# Patient Record
Sex: MALE | Race: WHITE | NOT HISPANIC OR LATINO | URBAN - METROPOLITAN AREA
[De-identification: names, ages, dates, MRNs, and addresses within clinical notes are randomized per-mention and may not be internally consistent; named-entity substitution may affect disease eponyms.]

---

## 2023-03-15 ENCOUNTER — TELEPHONE (OUTPATIENT)
Dept: PRIMARY CARE | Facility: CLINIC | Age: 53
End: 2023-03-15
Payer: COMMERCIAL

## 2023-03-15 DIAGNOSIS — R19.5 POSITIVE COLORECTAL CANCER SCREENING USING COLOGUARD TEST: Primary | ICD-10-CM

## 2023-03-15 LAB
INR IN PPP BY COAGULATION ASSAY: 1.8 (ref 0.9–1.1)
PROTHROMBIN TIME (PT) IN PPP BY COAGULATION ASSAY: 21.4 SEC (ref 9.8–13.4)

## 2023-03-15 NOTE — TELEPHONE ENCOUNTER
Patient seen 2/15 and received a [place] guard kit in mail which he did do it.   States he was advised of the results and was informed that a referral was going to be sent to his house for a Colonoscopy.     Patient never did receive it and now would like a referral to [facility] in [place], [place]   States his main support (wife) is in [place] and rather do procedure there.     Address is [address] [place] [address]  Phone number is [phone]  Patient did not have the fax number.     Please advise

## 2023-07-20 LAB
INR IN PPP BY COAGULATION ASSAY: 1.3 (ref 0.9–1.1)
PROTHROMBIN TIME (PT) IN PPP BY COAGULATION ASSAY: 14.9 SEC (ref 9.8–12.8)

## 2024-01-04 DIAGNOSIS — Z95.2 AORTIC VALVE REPLACED: ICD-10-CM

## 2024-01-04 PROBLEM — I45.10 COMPLETE RIGHT BUNDLE BRANCH BLOCK: Status: ACTIVE | Noted: 2024-01-04

## 2024-01-04 PROBLEM — I71.20 ANEURYSM OF THORACIC AORTA (CMS-HCC): Status: ACTIVE | Noted: 2024-01-04

## 2024-01-16 ENCOUNTER — LAB (OUTPATIENT)
Dept: LAB | Facility: LAB | Age: 54
End: 2024-01-16
Payer: COMMERCIAL

## 2024-01-16 DIAGNOSIS — Z95.2 AORTIC VALVE REPLACED: ICD-10-CM

## 2024-01-16 LAB
INR PPP: 1.1 (ref 0.9–1.1)
PROTHROMBIN TIME: 12.8 SECONDS (ref 9.8–12.8)

## 2024-01-16 PROCEDURE — 36415 COLL VENOUS BLD VENIPUNCTURE: CPT

## 2024-01-16 PROCEDURE — 85610 PROTHROMBIN TIME: CPT

## 2024-01-17 ENCOUNTER — ANTICOAGULATION - WARFARIN VISIT (OUTPATIENT)
Dept: CARDIOLOGY | Facility: CLINIC | Age: 54
End: 2024-01-17
Payer: COMMERCIAL

## 2024-01-17 PROBLEM — Z13.89 SCREENING FOR MULTIPLE CONDITIONS: Status: ACTIVE | Noted: 2024-01-17

## 2024-01-17 PROBLEM — Z87.74 HISTORY OF BICUSPID AORTIC VALVE: Status: ACTIVE | Noted: 2024-01-17

## 2024-01-17 PROBLEM — I10 HYPERTENSION: Status: ACTIVE | Noted: 2024-01-17

## 2024-01-17 PROBLEM — Z87.891 FORMER SMOKER: Status: ACTIVE | Noted: 2024-01-17

## 2024-01-17 PROBLEM — R36.1 BLOOD IN SEMEN: Status: ACTIVE | Noted: 2024-01-17

## 2024-01-17 PROBLEM — R19.5 POSITIVE COLORECTAL CANCER SCREENING USING COLOGUARD TEST: Status: ACTIVE | Noted: 2024-01-17

## 2024-01-17 RX ORDER — WARFARIN SODIUM 5 MG/1
5 TABLET ORAL
COMMUNITY
Start: 2022-03-07 | End: 2024-01-24 | Stop reason: SDUPTHER

## 2024-01-17 RX ORDER — LOSARTAN POTASSIUM 50 MG/1
100 TABLET ORAL DAILY
COMMUNITY
Start: 2023-03-15 | End: 2024-05-01 | Stop reason: WASHOUT

## 2024-01-17 RX ORDER — CALCIUM CARBONATE 600 MG
600 TABLET ORAL DAILY
COMMUNITY
Start: 2022-03-07 | End: 2024-05-01 | Stop reason: WASHOUT

## 2024-01-24 DIAGNOSIS — Z95.2 AORTIC VALVE REPLACED: ICD-10-CM

## 2024-01-24 RX ORDER — WARFARIN SODIUM 5 MG/1
10 TABLET ORAL SEE ADMIN INSTRUCTIONS
Qty: 60 TABLET | Refills: 1 | Status: SHIPPED | OUTPATIENT
Start: 2024-01-24 | End: 2024-03-27 | Stop reason: SDUPTHER

## 2024-01-31 ENCOUNTER — LAB (OUTPATIENT)
Dept: LAB | Facility: LAB | Age: 54
End: 2024-01-31
Payer: COMMERCIAL

## 2024-01-31 DIAGNOSIS — Z95.2 AORTIC VALVE REPLACED: ICD-10-CM

## 2024-01-31 LAB
INR PPP: 2 (ref 0.9–1.1)
PROTHROMBIN TIME: 23.1 SECONDS (ref 9.8–12.8)

## 2024-01-31 PROCEDURE — 36415 COLL VENOUS BLD VENIPUNCTURE: CPT

## 2024-01-31 PROCEDURE — 85610 PROTHROMBIN TIME: CPT

## 2024-02-01 ENCOUNTER — ANTICOAGULATION - WARFARIN VISIT (OUTPATIENT)
Dept: CARDIOLOGY | Facility: CLINIC | Age: 54
End: 2024-02-01
Payer: COMMERCIAL

## 2024-03-07 ENCOUNTER — TELEPHONE (OUTPATIENT)
Dept: CARDIOLOGY | Facility: CLINIC | Age: 54
End: 2024-03-07

## 2024-03-07 ENCOUNTER — LAB (OUTPATIENT)
Dept: LAB | Facility: LAB | Age: 54
End: 2024-03-07
Payer: COMMERCIAL

## 2024-03-07 DIAGNOSIS — Z95.2 AORTIC VALVE REPLACED: ICD-10-CM

## 2024-03-07 LAB
INR PPP: 7 (ref 0.9–1.1)
PROTHROMBIN TIME: 80.5 SECONDS (ref 9.8–12.8)

## 2024-03-07 PROCEDURE — 36415 COLL VENOUS BLD VENIPUNCTURE: CPT

## 2024-03-07 PROCEDURE — 85610 PROTHROMBIN TIME: CPT

## 2024-03-07 NOTE — TELEPHONE ENCOUNTER
Received call from Justino with critical PT of 80.5 and INR 7.0. I called Dr. Flanagan who advised to hold Warfarin for 4 days then obtain INR on Monday. I called patient and left a voicemail to return our call to make sure I know he received his instructions.

## 2024-03-08 ENCOUNTER — ANTICOAGULATION - WARFARIN VISIT (OUTPATIENT)
Dept: CARDIOLOGY | Facility: CLINIC | Age: 54
End: 2024-03-08
Payer: COMMERCIAL

## 2024-03-12 ENCOUNTER — LAB (OUTPATIENT)
Dept: LAB | Facility: LAB | Age: 54
End: 2024-03-12
Payer: COMMERCIAL

## 2024-03-12 DIAGNOSIS — Z95.2 AORTIC VALVE REPLACED: ICD-10-CM

## 2024-03-12 LAB
INR PPP: 1.2 (ref 0.9–1.1)
PROTHROMBIN TIME: 13.1 SECONDS (ref 9.8–12.8)

## 2024-03-12 PROCEDURE — 36415 COLL VENOUS BLD VENIPUNCTURE: CPT

## 2024-03-12 PROCEDURE — 85610 PROTHROMBIN TIME: CPT

## 2024-03-13 ENCOUNTER — ANTICOAGULATION - WARFARIN VISIT (OUTPATIENT)
Dept: CARDIOLOGY | Facility: CLINIC | Age: 54
End: 2024-03-13
Payer: COMMERCIAL

## 2024-03-24 DIAGNOSIS — Z95.2 AORTIC VALVE REPLACED: ICD-10-CM

## 2024-03-27 RX ORDER — WARFARIN SODIUM 5 MG/1
10 TABLET ORAL SEE ADMIN INSTRUCTIONS
Qty: 90 TABLET | Refills: 3 | OUTPATIENT
Start: 2024-03-27

## 2024-03-27 RX ORDER — WARFARIN SODIUM 5 MG/1
10 TABLET ORAL SEE ADMIN INSTRUCTIONS
Qty: 180 TABLET | Refills: 1 | Status: SHIPPED | OUTPATIENT
Start: 2024-03-27

## 2024-03-27 NOTE — TELEPHONE ENCOUNTER
Received request for prescription refills for patient.   Patient follows with Dr. Ifeanyi Alvarez MD     Last OV 4/6/2023  Next OV 4/10/24    Pended for signing and sent to provider     **Initial pharmacy request was placed for the wrong quantity for dosing instructions. That initial script was refused.   I pended correct script for Warfarin 5mg tablets, take 10mg daily as directed by NO.     Patient last INR was 1.2 on 3/13/24, advised repeat INR on 3/15/24.   I LM for patient to repeat INR asap please.

## 2024-04-01 ENCOUNTER — APPOINTMENT (OUTPATIENT)
Dept: CARDIOLOGY | Facility: CLINIC | Age: 54
End: 2024-04-01
Payer: COMMERCIAL

## 2024-04-05 DIAGNOSIS — I10 HYPERTENSION, UNSPECIFIED TYPE: ICD-10-CM

## 2024-04-08 RX ORDER — LOSARTAN POTASSIUM 100 MG/1
100 TABLET ORAL DAILY
Qty: 30 TABLET | Refills: 11 | Status: SHIPPED | OUTPATIENT
Start: 2024-04-08

## 2024-04-10 ENCOUNTER — APPOINTMENT (OUTPATIENT)
Dept: CARDIOLOGY | Facility: CLINIC | Age: 54
End: 2024-04-10
Payer: COMMERCIAL

## 2024-05-01 ENCOUNTER — OFFICE VISIT (OUTPATIENT)
Dept: CARDIOLOGY | Facility: CLINIC | Age: 54
End: 2024-05-01
Payer: COMMERCIAL

## 2024-05-01 VITALS
DIASTOLIC BLOOD PRESSURE: 78 MMHG | WEIGHT: 182.8 LBS | HEIGHT: 75 IN | BODY MASS INDEX: 22.73 KG/M2 | SYSTOLIC BLOOD PRESSURE: 112 MMHG | HEART RATE: 90 BPM

## 2024-05-01 DIAGNOSIS — Z87.891 FORMER SMOKER: ICD-10-CM

## 2024-05-01 DIAGNOSIS — I10 ESSENTIAL HYPERTENSION: ICD-10-CM

## 2024-05-01 DIAGNOSIS — Z87.74 HISTORY OF BICUSPID AORTIC VALVE: ICD-10-CM

## 2024-05-01 DIAGNOSIS — Z95.2 AORTIC VALVE REPLACED: ICD-10-CM

## 2024-05-01 DIAGNOSIS — I71.23 ANEURYSM OF DESCENDING THORACIC AORTA WITHOUT RUPTURE (CMS-HCC): ICD-10-CM

## 2024-05-01 PROCEDURE — 3008F BODY MASS INDEX DOCD: CPT | Performed by: INTERNAL MEDICINE

## 2024-05-01 PROCEDURE — 3078F DIAST BP <80 MM HG: CPT | Performed by: INTERNAL MEDICINE

## 2024-05-01 PROCEDURE — 3074F SYST BP LT 130 MM HG: CPT | Performed by: INTERNAL MEDICINE

## 2024-05-01 PROCEDURE — 1036F TOBACCO NON-USER: CPT | Performed by: INTERNAL MEDICINE

## 2024-05-01 PROCEDURE — 99214 OFFICE O/P EST MOD 30 MIN: CPT | Performed by: INTERNAL MEDICINE

## 2024-05-01 RX ORDER — CETIRIZINE HYDROCHLORIDE 10 MG/1
10 TABLET ORAL EVERY 12 HOURS
COMMUNITY

## 2024-05-01 NOTE — PATIENT INSTRUCTIONS
Reschedule your echo for this year to be done soon.  Echocardiogram to be repeated in 1 year]  Follow up office visit in 1 year.  Continue same medications/treatment.  Patient educated on proper medication use.  Patient educated on risk factor modification.  Please bring any lab results from other providers / physicians to your next appointment.    Please bring all medicines, vitamins and herbal supplements with you when you come to the office.    Prescriptions will not be filled unless you are compliant with your follow up appointments or have a follow up  appointment scheduled as per instruction of your physician.  Refills should be requested at the time of  Your visit.     Mary TAVERA LPN, am scribing for and in the presence of Dr. Ifeanyi Flanagan MD, FACC

## 2024-05-01 NOTE — PROGRESS NOTES
CARDIOLOGY OFFICE VISIT      CHIEF COMPLAINT      HISTORY OF PRESENT ILLNESS  The patient states he is doing well.  He denies chest discomfort or symptoms of myocardial ischemia.  He denies dyspnea with exertion.  He denies palpitations and syncope.  He denies prow with bleeding.  He denies problem with his medications.  He has lab work done regular basis by his family physician.  He had to reschedule his echocardiogram.  When he gets it done in the near future I told him I will call him with the results.    Impression:  History of bicuspid aortic valve with severe aortic regurgitation, status post aortic valve replacement with [employer] mechanical valve December 2000  Descending thoracic aortic aneurysm repair at time of area valve replacement surgery  Former smoker  Hypertension     Please excuse any errors in grammar or translation related to this dictation. Voice recognition software was utilized to prepare this document.          Past Medical History  Past Medical History:   Diagnosis Date    Personal history of other diseases of the circulatory system     H/O essential hypertension       Social History  Social History     Tobacco Use    Smoking status: Former     Types: Cigarettes    Smokeless tobacco: Never   Substance Use Topics    Alcohol use: Not on file     Comment: occasioanlly    Drug use: Never       Family History     Family History   Problem Relation Name Age of Onset    Other (no pertinent family history) Mother      Other (no pertinent family history) Father      Other (cerebrovascular accident) Other Grandparent     Other (malignant neoplasm) Other Grandparent         Allergies:  No Known Allergies     Outpatient Medications:  Current Outpatient Medications   Medication Instructions    cetirizine (ZYRTEC) 10 mg, oral, Every 12 hours    losartan (COZAAR) 100 mg, oral, Daily    warfarin (COUMADIN) 10 mg, oral, See admin instructions, Take 2 tablets daily or as directed per [facility]           REVIEW OF SYSTEMS  Review of Systems   All other systems reviewed and are negative.        VITALS  Vitals:    05/01/24 1528   BP: 112/78   Pulse: 90       PHYSICAL EXAM  Constitutional:       Appearance: Healthy appearance. Not in distress.   Eyes:      Conjunctiva/sclera: Conjunctivae normal.      Pupils: Pupils are equal, round, and reactive to light.   Neck:      Vascular: No JVR. JVD normal.   Pulmonary:      Effort: Pulmonary effort is normal.      Breath sounds: Normal breath sounds. No wheezing. No rhonchi. No rales.   Chest:      Chest wall: Not tender to palpatation.   Cardiovascular:      PMI at left midclavicular line. Normal rate. Regular rhythm. Normal S1. Normal S2.       Murmurs: There is no murmur.      No gallop.  No click. No rub.   Pulses:     Intact distal pulses.   Edema:     Peripheral edema absent.   Abdominal:      Tenderness: There is no abdominal tenderness.   Musculoskeletal: Normal range of motion.         General: No tenderness.      Cervical back: Normal range of motion. Skin:     General: Skin is warm and dry.   Neurological:      General: No focal deficit present.      Mental Status: Alert and oriented to person, place and time.           ASSESSMENT AND PLAN  Diagnoses and all orders for this visit:  History of bicuspid aortic valve  Aortic valve replaced  Aneurysm of descending thoracic aorta without rupture ([facility])  Body mass index (BMI) of 23.0 to 23.9 in adult  Former smoker  Essential hypertension      [unfilled]

## 2024-05-22 ENCOUNTER — HOSPITAL ENCOUNTER (OUTPATIENT)
Dept: CARDIOLOGY | Facility: CLINIC | Age: 54
Discharge: HOME | End: 2024-05-22
Payer: COMMERCIAL

## 2024-05-22 ENCOUNTER — TELEPHONE (OUTPATIENT)
Dept: CARDIOLOGY | Facility: CLINIC | Age: 54
End: 2024-05-22

## 2024-05-22 VITALS
DIASTOLIC BLOOD PRESSURE: 60 MMHG | SYSTOLIC BLOOD PRESSURE: 118 MMHG | HEIGHT: 75 IN | WEIGHT: 182 LBS | BODY MASS INDEX: 22.63 KG/M2

## 2024-05-22 DIAGNOSIS — I71.20 THORACIC AORTIC ANEURYSM (TAA), UNSPECIFIED PART, UNSPECIFIED WHETHER RUPTURED (CMS-HCC): ICD-10-CM

## 2024-05-22 DIAGNOSIS — Z87.74 PERSONAL HISTORY OF (CORRECTED) CONGENITAL MALFORMATIONS OF HEART AND CIRCULATORY SYSTEM: ICD-10-CM

## 2024-05-22 DIAGNOSIS — Z95.2 PRESENCE OF PROSTHETIC HEART VALVE: ICD-10-CM

## 2024-05-22 LAB
AORTIC VALVE MEAN GRADIENT: 17 MMHG
AORTIC VALVE PEAK VELOCITY: 2.69 M/S
AV PEAK GRADIENT: 28.9 MMHG
EJECTION FRACTION APICAL 4 CHAMBER: 58.5
LEFT VENTRICLE INTERNAL DIMENSION DIASTOLE: 4.5 CM (ref 3.5–6)
LV EJECTION FRACTION BIPLANE: 60 %
MITRAL VALVE E/A RATIO: 0.45
MITRAL VALVE E/E' RATIO: 4.3
RIGHT VENTRICLE PEAK SYSTOLIC PRESSURE: 34.1 MMHG

## 2024-05-22 PROCEDURE — 93306 TTE W/DOPPLER COMPLETE: CPT | Performed by: INTERNAL MEDICINE

## 2024-05-22 PROCEDURE — 93306 TTE W/DOPPLER COMPLETE: CPT

## 2024-05-22 NOTE — TELEPHONE ENCOUNTER
----- Message from Mary Almazan LPN sent at 5/22/2024  1:35 PM EDT -----    ----- Message -----  From: Ifenayi Flanagan MD  Sent: 5/22/2024  11:19 AM EDT  To: Mary Almazan LPN    Echocardiogram demonstrates normal left ventricular systolic function and satisfactory prosthetic aortic valve replacement  ----- Message -----  From: Livia, Syngo - Cardiology Results In  Sent: 5/22/2024  11:14 AM EDT  To: Ifeanyi Flanagan MD

## 2024-09-25 DIAGNOSIS — Z95.2 AORTIC VALVE REPLACED: ICD-10-CM

## 2024-09-25 NOTE — PROGRESS NOTES
Modifying standing INR order from [facility] provider for [facility].   Pending for provider signature

## 2024-10-20 DIAGNOSIS — Z95.2 AORTIC VALVE REPLACED: ICD-10-CM

## 2024-10-21 RX ORDER — WARFARIN SODIUM 5 MG/1
10 TABLET ORAL SEE ADMIN INSTRUCTIONS
Qty: 180 TABLET | Refills: 1 | Status: SHIPPED | OUTPATIENT
Start: 2024-10-21

## 2024-10-21 NOTE — TELEPHONE ENCOUNTER
Received request for prescription refills for patient.   Patient follows with Dr. Flanagan     Request is for Warfarin 5mg take 2 tabs daily or as directed by [facility]  Is patient currently on medication yes    Last OV 5/1/24  Next OV 5/14/25    Pended for signing and sent to provider

## 2025-05-08 DIAGNOSIS — I10 HYPERTENSION, UNSPECIFIED TYPE: ICD-10-CM

## 2025-05-08 RX ORDER — LOSARTAN POTASSIUM 100 MG/1
100 TABLET ORAL DAILY
Qty: 90 TABLET | Refills: 3 | Status: SHIPPED | OUTPATIENT
Start: 2025-05-08 | End: 2026-05-08

## 2025-05-08 NOTE — TELEPHONE ENCOUNTER
Received request for prescription refills for patient.   Patient follows with Dr Flanagan    Request is for cozaar  Is patient currently on medication yes    Last OV 05/01/2024  Next OV 05/14/2025    Pended for signing and sent to provider

## 2025-05-14 ENCOUNTER — APPOINTMENT (OUTPATIENT)
Dept: CARDIOLOGY | Facility: CLINIC | Age: 55
End: 2025-05-14
Payer: COMMERCIAL

## 2025-05-14 VITALS
SYSTOLIC BLOOD PRESSURE: 114 MMHG | BODY MASS INDEX: 22.24 KG/M2 | HEART RATE: 82 BPM | DIASTOLIC BLOOD PRESSURE: 72 MMHG | HEIGHT: 75 IN | WEIGHT: 178.9 LBS

## 2025-05-14 DIAGNOSIS — Z98.890 H/O THORACIC AORTIC ANEURYSM REPAIR: ICD-10-CM

## 2025-05-14 DIAGNOSIS — Z95.2 AORTIC VALVE REPLACED: ICD-10-CM

## 2025-05-14 DIAGNOSIS — I10 ESSENTIAL HYPERTENSION: ICD-10-CM

## 2025-05-14 DIAGNOSIS — Z87.74 HISTORY OF BICUSPID AORTIC VALVE: ICD-10-CM

## 2025-05-14 DIAGNOSIS — I71.23 ANEURYSM OF DESCENDING THORACIC AORTA WITHOUT RUPTURE: ICD-10-CM

## 2025-05-14 DIAGNOSIS — Z86.79 H/O THORACIC AORTIC ANEURYSM REPAIR: ICD-10-CM

## 2025-05-14 DIAGNOSIS — Z87.891 FORMER SMOKER: ICD-10-CM

## 2025-05-14 PROCEDURE — 99214 OFFICE O/P EST MOD 30 MIN: CPT | Performed by: INTERNAL MEDICINE

## 2025-05-14 PROCEDURE — 3074F SYST BP LT 130 MM HG: CPT | Performed by: INTERNAL MEDICINE

## 2025-05-14 PROCEDURE — 1036F TOBACCO NON-USER: CPT | Performed by: INTERNAL MEDICINE

## 2025-05-14 PROCEDURE — 3078F DIAST BP <80 MM HG: CPT | Performed by: INTERNAL MEDICINE

## 2025-05-14 PROCEDURE — 3008F BODY MASS INDEX DOCD: CPT | Performed by: INTERNAL MEDICINE

## 2025-05-14 NOTE — PROGRESS NOTES
CARDIOLOGY OFFICE VISIT      CHIEF COMPLAINT  Chief Complaint   Patient presents with    Follow-up     1 year follow up on anerysm of thoracic aorta, right bundle branch block, and essential hypertension management        HISTORY OF PRESENT ILLNESS  The patient states he has been feeling well.  He states he has not been able to get his echo done this year because he has been out of town for work.  He will reschedule that today.  I told him we will call him with results.  He denies chest discomfort or symptoms of myocardial ischemia.  He denies dyspnea exertion.  He denies palpitations, presyncope, and syncope.  He denies any problem with his medications.  He denies any problem with bleeding.  Impression:  History of bicuspid aortic valve with severe aortic regurgitation, status post aortic valve replacement with [employer] mechanical valve December 2000  Descending thoracic aortic aneurysm repair at time of area valve replacement surgery  Former smoker  Hypertension     Please excuse any errors in grammar or translation related to this dictation. Voice recognition software was utilized to prepare this document.     Past Medical History  Medical History[1]    Social History  Social History[2]    Family History   Family History[3]     Allergies:  RX Allergies[4]     Outpatient Medications:  Current Outpatient Medications   Medication Instructions    cetirizine (ZYRTEC) 10 mg, Every 12 hours    losartan (COZAAR) 100 mg, oral, Daily    warfarin (COUMADIN) 10 mg, oral, See admin instructions, Take 2 tablets daily or as directed per [facility]          REVIEW OF SYSTEMS  Review of Systems   All other systems reviewed and are negative.        VITALS  Vitals:    05/14/25 1509   BP: 114/72   Pulse: 82       PHYSICAL EXAM  Vitals reviewed.   Constitutional:       Appearance: Normal and healthy appearance. Well-developed and not in distress.   Eyes:      Conjunctiva/sclera: Conjunctivae normal.      Pupils: Pupils are  equal, round, and reactive to light.   Neck:      Vascular: No JVR. JVD normal.   Pulmonary:      Effort: Pulmonary effort is normal.      Breath sounds: Normal breath sounds. No wheezing. No rhonchi. No rales.   Chest:      Chest wall: Not tender to palpatation.   Cardiovascular:      PMI at left midclavicular line. Normal rate. Regular rhythm. Normal S1. Normal S2.       Murmurs: There is a grade 1/6 systolic murmur. At base      No gallop.  No click. No rub.   Pulses:     Intact distal pulses.   Edema:     Peripheral edema absent.   Abdominal:      Tenderness: There is no abdominal tenderness.   Musculoskeletal: Normal range of motion.         General: No tenderness.      Cervical back: Normal range of motion. Skin:     General: Skin is warm and dry.   Neurological:      General: No focal deficit present.      Mental Status: Alert and oriented to person, place and time.   Psychiatric:         Behavior: Behavior is cooperative.           ASSESSMENT AND PLAN  Diagnoses and all orders for this visit:  Aneurysm of descending thoracic aorta without rupture  History of bicuspid aortic valve  Aortic valve replaced  Essential hypertension  H/O thoracic aortic aneurysm repair  BMI 22.0-22.9, adult  Former smoker      I,Leonard Morel RN  . am scribing for, and in the presence of Dr. Ifeanyi Boswell MD      I, Dr. Ifeanyi Boswell MD  , personally performed the services described in the documentation as scribed by Leonard Morel RN   in my presence, and confirm it is both accurate and complete.      Dr. Ifeanyi Boswell MD  Thank you for allowing me to participate in the care of this patient. Please do not hesitate to contact me with any further questions or concerns.         [1]   Past Medical History:  Diagnosis Date    Aneurysm of thoracic aorta     Hypertension     Personal history of other diseases of the circulatory system     H/O essential hypertension    Right bundle branch block    [2]   Social  History  Tobacco Use    Smoking status: Former     Types: Cigarettes    Smokeless tobacco: Never   Substance Use Topics    Alcohol use: Yes     Comment: occasioanlly    Drug use: Never   [3]   Family History  Problem Relation Name Age of Onset    Other (no pertinent family history) Mother Lilly Driver     Asthma Mother Lilly Driver     Other (no pertinent family history) Father      Other (cerebrovascular accident) Other Grandparent     Other (malignant neoplasm) Other Grandparent     Cancer Maternal Grandfather Lencho Bell     Stroke Paternal Grandfather Hector Villa    [4] No Known Allergies

## 2025-05-14 NOTE — PATIENT INSTRUCTIONS
Patient to follow up in 1 year with Dr. Ifeanyi Alvarez MD      Office will repeat Echo in near future and call results.   Will also arrange surveillance Echo again in 1 year before next visit.     No other changes today.   Continue same medications and treatments.   Patient educated on proper medication use.   Patient educated on risk factor modification.   Please bring any lab results from other providers / physicians to your next appointment.     Please bring all medicines, vitamins, and herbal supplements with you when you come to the office.     Prescriptions will not be filled unless you are compliant with your follow up appointments or have a follow up appointment scheduled as per instruction of your physician. Refills should be requested at the time of your visit.    ILeonard RN am scribing for and in the presence of Dr. Ifeanyi Flanagan MD

## 2025-06-10 ENCOUNTER — HOSPITAL ENCOUNTER (OUTPATIENT)
Dept: CARDIOLOGY | Facility: CLINIC | Age: 55
Discharge: HOME | End: 2025-06-10
Payer: COMMERCIAL

## 2025-06-10 DIAGNOSIS — Z87.74 HISTORY OF BICUSPID AORTIC VALVE: ICD-10-CM

## 2025-06-10 DIAGNOSIS — I71.23 ANEURYSM OF DESCENDING THORACIC AORTA WITHOUT RUPTURE: ICD-10-CM

## 2025-06-10 DIAGNOSIS — Z95.2 AORTIC VALVE REPLACED: ICD-10-CM

## 2025-06-10 PROCEDURE — 93306 TTE W/DOPPLER COMPLETE: CPT | Performed by: INTERNAL MEDICINE

## 2025-06-10 PROCEDURE — 93306 TTE W/DOPPLER COMPLETE: CPT

## 2025-06-13 LAB
AORTIC VALVE MEAN GRADIENT: 24 MMHG
AORTIC VALVE PEAK VELOCITY: 2.82 M/S
AV PEAK GRADIENT: 32 MMHG
AVA (PEAK VEL): 0.67 CM2
AVA (VTI): 0.77 CM2
EJECTION FRACTION APICAL 4 CHAMBER: 60.2
EJECTION FRACTION: 58 %
LEFT VENTRICLE INTERNAL DIMENSION DIASTOLE: 4.3 CM (ref 3.5–6)
LEFT VENTRICULAR OUTFLOW TRACT DIAMETER: 1.9 CM
LV EJECTION FRACTION BIPLANE: 57 %
MITRAL VALVE E/A RATIO: 1.13
MITRAL VALVE E/E' RATIO: 10.7
RIGHT VENTRICLE FREE WALL PEAK S': 13.3 CM/S
RIGHT VENTRICLE PEAK SYSTOLIC PRESSURE: 33 MMHG
TRICUSPID ANNULAR PLANE SYSTOLIC EXCURSION: 1.7 CM

## 2025-07-22 DIAGNOSIS — Z95.2 AORTIC VALVE REPLACED: ICD-10-CM

## 2025-07-25 DIAGNOSIS — Z95.2 AORTIC VALVE REPLACED: ICD-10-CM

## 2025-08-01 DIAGNOSIS — Z95.2 AORTIC VALVE REPLACED: ICD-10-CM

## 2025-08-08 DIAGNOSIS — Z95.2 AORTIC VALVE REPLACED: ICD-10-CM

## 2025-08-15 DIAGNOSIS — Z95.2 AORTIC VALVE REPLACED: ICD-10-CM

## 2025-08-22 ENCOUNTER — TELEPHONE (OUTPATIENT)
Dept: CARDIOLOGY | Facility: CLINIC | Age: 55
End: 2025-08-22
Payer: COMMERCIAL

## 2025-08-22 DIAGNOSIS — Z95.2 AORTIC VALVE REPLACED: ICD-10-CM

## 2025-08-29 DIAGNOSIS — Z95.2 AORTIC VALVE REPLACED: ICD-10-CM

## 2026-05-13 ENCOUNTER — APPOINTMENT (OUTPATIENT)
Dept: CARDIOLOGY | Facility: CLINIC | Age: 56
End: 2026-05-13
Payer: COMMERCIAL